# Patient Record
Sex: MALE | Race: OTHER | HISPANIC OR LATINO | Employment: FULL TIME | URBAN - METROPOLITAN AREA
[De-identification: names, ages, dates, MRNs, and addresses within clinical notes are randomized per-mention and may not be internally consistent; named-entity substitution may affect disease eponyms.]

---
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
## 2021-02-11 ENCOUNTER — OCCMED (OUTPATIENT)
Dept: URGENT CARE | Facility: CLINIC | Age: 21
End: 2021-02-11
Payer: OTHER MISCELLANEOUS

## 2021-02-11 DIAGNOSIS — Z02.6 ENCOUNTER RELATED TO WORKER'S COMPENSATION CLAIM: Primary | ICD-10-CM

## 2021-02-11 PROCEDURE — G0382 LEV 3 HOSP TYPE B ED VISIT: HCPCS | Performed by: NURSE PRACTITIONER

## 2021-02-11 PROCEDURE — 99283 EMERGENCY DEPT VISIT LOW MDM: CPT | Performed by: NURSE PRACTITIONER

## 2021-02-15 ENCOUNTER — OFFICE VISIT (OUTPATIENT)
Dept: URGENT CARE | Facility: CLINIC | Age: 21
End: 2021-02-15
Payer: OTHER MISCELLANEOUS

## 2021-02-15 DIAGNOSIS — Z02.6 ENCOUNTER RELATED TO WORKER'S COMPENSATION CLAIM: Primary | ICD-10-CM

## 2021-02-15 PROCEDURE — 99213 OFFICE O/P EST LOW 20 MIN: CPT | Performed by: NURSE PRACTITIONER

## 2021-07-14 ENCOUNTER — HOSPITAL ENCOUNTER (EMERGENCY)
Facility: HOSPITAL | Age: 21
Discharge: HOME | End: 2021-07-14
Attending: EMERGENCY MEDICINE | Admitting: EMERGENCY MEDICINE
Payer: COMMERCIAL

## 2021-07-14 VITALS
HEART RATE: 65 BPM | OXYGEN SATURATION: 98 % | RESPIRATION RATE: 16 BRPM | DIASTOLIC BLOOD PRESSURE: 58 MMHG | TEMPERATURE: 98.4 F | SYSTOLIC BLOOD PRESSURE: 120 MMHG

## 2021-07-14 DIAGNOSIS — R10.9 ABDOMINAL PAIN: Primary | ICD-10-CM

## 2021-07-14 LAB
ALBUMIN SERPL BCP-MCNC: 3.3 G/DL (ref 3.5–5)
ALP SERPL-CCNC: 50 U/L (ref 46–116)
ALT SERPL W P-5'-P-CCNC: 31 U/L (ref 12–78)
ANION GAP SERPL CALCULATED.3IONS-SCNC: 3 MMOL/L (ref 4–13)
AST SERPL W P-5'-P-CCNC: 20 U/L (ref 5–45)
BASOPHILS # BLD AUTO: 0.01 THOUSANDS/ΜL (ref 0–0.1)
BASOPHILS NFR BLD AUTO: 0 % (ref 0–1)
BILIRUB DIRECT SERPL-MCNC: 0.05 MG/DL (ref 0–0.2)
BILIRUB SERPL-MCNC: 0.52 MG/DL (ref 0.2–1)
BUN SERPL-MCNC: 13 MG/DL (ref 5–25)
CALCIUM SERPL-MCNC: 7.3 MG/DL (ref 8.3–10.1)
CHLORIDE SERPL-SCNC: 107 MMOL/L (ref 100–108)
CO2 SERPL-SCNC: 32 MMOL/L (ref 21–32)
CREAT SERPL-MCNC: 0.9 MG/DL (ref 0.6–1.3)
EOSINOPHIL # BLD AUTO: 0.08 THOUSAND/ΜL (ref 0–0.61)
EOSINOPHIL NFR BLD AUTO: 1 % (ref 0–6)
ERYTHROCYTE [DISTWIDTH] IN BLOOD BY AUTOMATED COUNT: 12.2 % (ref 11.6–15.1)
GFR SERPL CREATININE-BSD FRML MDRD: 122 ML/MIN/1.73SQ M
GLUCOSE SERPL-MCNC: 115 MG/DL (ref 65–140)
HCT VFR BLD AUTO: 44.9 % (ref 36.5–49.3)
HGB BLD-MCNC: 15.5 G/DL (ref 12–17)
IMM GRANULOCYTES # BLD AUTO: 0.01 THOUSAND/UL (ref 0–0.2)
IMM GRANULOCYTES NFR BLD AUTO: 0 % (ref 0–2)
LIPASE SERPL-CCNC: 111 U/L (ref 73–393)
LYMPHOCYTES # BLD AUTO: 2.58 THOUSANDS/ΜL (ref 0.6–4.47)
LYMPHOCYTES NFR BLD AUTO: 42 % (ref 14–44)
MCH RBC QN AUTO: 29.6 PG (ref 26.8–34.3)
MCHC RBC AUTO-ENTMCNC: 34.5 G/DL (ref 31.4–37.4)
MCV RBC AUTO: 86 FL (ref 82–98)
MONOCYTES # BLD AUTO: 0.56 THOUSAND/ΜL (ref 0.17–1.22)
MONOCYTES NFR BLD AUTO: 9 % (ref 4–12)
NEUTROPHILS # BLD AUTO: 2.92 THOUSANDS/ΜL (ref 1.85–7.62)
NEUTS SEG NFR BLD AUTO: 48 % (ref 43–75)
NRBC BLD AUTO-RTO: 0 /100 WBCS
PLATELET # BLD AUTO: 240 THOUSANDS/UL (ref 149–390)
PMV BLD AUTO: 10.1 FL (ref 8.9–12.7)
POTASSIUM SERPL-SCNC: 3.6 MMOL/L (ref 3.5–5.3)
PROT SERPL-MCNC: 6.9 G/DL (ref 6.4–8.2)
RBC # BLD AUTO: 5.23 MILLION/UL (ref 3.88–5.62)
SODIUM SERPL-SCNC: 142 MMOL/L (ref 136–145)
WBC # BLD AUTO: 6.16 THOUSAND/UL (ref 4.31–10.16)

## 2021-07-14 PROCEDURE — 80048 BASIC METABOLIC PNL TOTAL CA: CPT | Performed by: EMERGENCY MEDICINE

## 2021-07-14 PROCEDURE — 85025 COMPLETE CBC W/AUTO DIFF WBC: CPT | Performed by: EMERGENCY MEDICINE

## 2021-07-14 PROCEDURE — 83690 ASSAY OF LIPASE: CPT | Performed by: EMERGENCY MEDICINE

## 2021-07-14 PROCEDURE — 96374 THER/PROPH/DIAG INJ IV PUSH: CPT

## 2021-07-14 PROCEDURE — 99284 EMERGENCY DEPT VISIT MOD MDM: CPT

## 2021-07-14 PROCEDURE — 99284 EMERGENCY DEPT VISIT MOD MDM: CPT | Performed by: EMERGENCY MEDICINE

## 2021-07-14 PROCEDURE — 36415 COLL VENOUS BLD VENIPUNCTURE: CPT | Performed by: EMERGENCY MEDICINE

## 2021-07-14 PROCEDURE — 80076 HEPATIC FUNCTION PANEL: CPT | Performed by: EMERGENCY MEDICINE

## 2021-07-14 RX ORDER — SUCRALFATE 1 G/1
1 TABLET ORAL ONCE
Status: COMPLETED | OUTPATIENT
Start: 2021-07-14 | End: 2021-07-14

## 2021-07-14 RX ORDER — ONDANSETRON 2 MG/ML
4 INJECTION INTRAMUSCULAR; INTRAVENOUS ONCE
Status: COMPLETED | OUTPATIENT
Start: 2021-07-14 | End: 2021-07-14

## 2021-07-14 RX ORDER — SUCRALFATE 1 G/1
1 TABLET ORAL 4 TIMES DAILY
Qty: 60 TABLET | Refills: 0 | Status: SHIPPED | OUTPATIENT
Start: 2021-07-14

## 2021-07-14 RX ADMIN — SODIUM CHLORIDE 1000 ML: 0.9 INJECTION, SOLUTION INTRAVENOUS at 00:48

## 2021-07-14 RX ADMIN — SUCRALFATE 1 G: 1 TABLET ORAL at 00:48

## 2021-07-14 RX ADMIN — ONDANSETRON 4 MG: 2 INJECTION INTRAMUSCULAR; INTRAVENOUS at 00:49

## 2021-07-14 NOTE — Clinical Note
Sven Reyescrystal was seen and treated in our emergency department on 7/14/2021  Diagnosis:     Adeola Brenner    He may return on this date: 07/15/2021    Or sooner     If you have any questions or concerns, please don't hesitate to call        Paramjit Bonner MD    ______________________________           _______________          _______________  Hospital Representative                              Date                                Time

## 2021-07-14 NOTE — DISCHARGE INSTRUCTIONS
Patient Instructions: Today you were seen in the emergency department for abdominal pain  We reviewed your labs and determined that you would be able to be discharged  You should take carafate and tylenol as needed for your pain  You should follow up with GI  Please return to the emergency department if your symptoms get worse, you develop a fever, or you have any other symptoms we discussed today prior to discharge  Nice to meet you! Best of luck with everything!

## 2021-07-14 NOTE — ED ATTENDING ATTESTATION
2021  Kwasi KYLE Ra, DO, saw and evaluated the patient  I have discussed the patient with the resident/non-physician practitioner and agree with the resident's/non-physician practitioner's findings, Plan of Care, and MDM as documented in the resident's/non-physician practitioner's note, except where noted  All available labs and Radiology studies were reviewed  I was present for key portions of any procedure(s) performed by the resident/non-physician practitioner and I was immediately available to provide assistance  At this point I agree with the current assessment done in the Emergency Department  I have conducted an independent evaluation of this patient a history and physical is as follows:    Dany KYLE DO, saw and evaluated the patient  All available labs and X-rays were reviewed  I discussed the patient with the resident / non-physician and agree with the resident's / non-physician practitioner's findings and plan as documented in the resident's / non-physician practicitioner's note, except where noted  At this point, I agree with the current assessment done in the ED      NAME: Govind Camilo  AGE: 23 y o  SEX: male  : 2000   MRN: [id number]  ENCOUNTER: [id number]    DATE: 2021  TIME: 2:16 AM      History of Present Illness   Govind Camilo is a 23 y o  male who presents with Abdominal Pain (Pt reports mid quad abd pain, nausea and diarrhea on/off for two weeks  )    has no past medical history on file        Past Medical History   History reviewed  No pertinent past medical history  Past Surgical History   History reviewed  No pertinent surgical history  Social History     Social History     Substance and Sexual Activity   Alcohol Use No     Social History     Substance and Sexual Activity   Drug Use No     Social History     Tobacco Use   Smoking Status Never Smoker       Family History   History reviewed  No pertinent family history      Medications Prior to Admission     Prior to Admission medications    Medication Sig Start Date End Date Taking? Authorizing Provider   sucralfate (CARAFATE) 1 g tablet Take 1 tablet (1 g total) by mouth 4 (four) times a day 7/14/21   Trisha Hyatt MD       Allergies   No Known Allergies    Objective     Vitals:    07/14/21 0012 07/14/21 0214   BP: 144/89 120/58   BP Location:  Left arm   Pulse: 76 65   Resp: 18 16   Temp: 98 4 °F (36 9 °C)    TempSrc: Oral    SpO2: 97% 98%     There is no height or weight on file to calculate BMI  Intake/Output Summary (Last 24 hours) at 7/14/2021 0216  Last data filed at 7/14/2021 0117  Gross per 24 hour   Intake 1000 ml   Output --   Net 1000 ml     Invasive Devices     None                 Physical Exam - Examined after meds  General: awake, alert, no acute distress  Head: normocephalic, atraumatic  Eyes: no scleral icterus  Ears: external ears normal, hearing grossly intact  Nose: external exam grossly normal  Neck: symmetric, No JVD noted, trachea midline  Pulmonary: no respiratory distress, no tachypnea noted  Cardiovascular: appears well perfused  Abdomen: no distention noted, soft, non tender, no rigidity, rebound or guarding  Musculoskeletal: no deformities noted, tone normal  Neuro: grossly non-focal  Psych: mood and affect appropriate    Lab Results:    Labs Reviewed   BASIC METABOLIC PANEL - Abnormal       Result Value Ref Range Status    Sodium 142  136 - 145 mmol/L Final    Potassium 3 6  3 5 - 5 3 mmol/L Final    Comment: Slightly Hemolyzed;  Results May be Affected    Chloride 107  100 - 108 mmol/L Final    CO2 32  21 - 32 mmol/L Final    ANION GAP 3 (*) 4 - 13 mmol/L Final    BUN 13  5 - 25 mg/dL Final    Creatinine 0 90  0 60 - 1 30 mg/dL Final    Comment: Standardized to IDMS reference method    Glucose 115  65 - 140 mg/dL Final    Comment: Specimen Lipemic; Results May be Affected  If the patient is fasting, the ADA then defines impaired fasting glucose as > 100 mg/dL and diabetes as > or equal to 123 mg/dL  Specimen collection should occur prior to Sulfasalazine administration due to the potential for falsely depressed results  Specimen collection should occur prior to Sulfapyridine administration due to the potential for falsely elevated results  Calcium 7 3 (*) 8 3 - 10 1 mg/dL Final    eGFR 122  ml/min/1 73sq m Final    Narrative:     [place] guidelines for Chronic Kidney Disease (CKD):     Stage 1 with normal or high GFR (GFR > 90 mL/min/1 73 square meters)    Stage 2 Mild CKD (GFR = 60-89 mL/min/1 73 square meters)    Stage 3A Moderate CKD (GFR = 45-59 mL/min/1 73 square meters)    Stage 3B Moderate CKD (GFR = 30-44 mL/min/1 73 square meters)    Stage 4 Severe CKD (GFR = 15-29 mL/min/1 73 square meters)    Stage 5 End Stage CKD (GFR <15 mL/min/1 73 square meters)  Note: GFR calculation is accurate only with a steady state creatinine   HEPATIC FUNCTION PANEL - Abnormal    Total Bilirubin 0 52  0 20 - 1 00 mg/dL Final    Comment: Use of this assay is not recommended for patients undergoing treatment with eltrombopag due to the potential for falsely elevated results  Bilirubin, Direct 0 05  0 00 - 0 20 mg/dL Final    Comment: 13Specimen Lipemic; Results May be Affected    Alkaline Phosphatase 50  46 - 116 U/L Final    AST 20  5 - 45 U/L Final    Comment: 6  Specimen collection should occur prior to Sulfasalazine administration due to the potential for falsely depressed results  ALT 31  12 - 78 U/L Final    Comment: 6  Specimen collection should occur prior to Sulfasalazine administration due to the potential for falsely depressed results       Total Protein 6 9  6 4 - 8 2 g/dL Final    Comment: 6    Albumin 3 3 (*) 3 5 - 5 0 g/dL Final   LIPASE - Normal    Lipase 111  73 - 393 u/L Final   CBC AND DIFFERENTIAL    WBC 6 16  4 31 - 10 16 Thousand/uL Final    RBC 5 23  3 88 - 5 62 Million/uL Final    Hemoglobin 15 5  12 0 - 17 0 g/dL Final Hematocrit 44 9  36 5 - 49 3 % Final    MCV 86  82 - 98 fL Final    MCH 29 6  26 8 - 34 3 pg Final    MCHC 34 5  31 4 - 37 4 g/dL Final    RDW 12 2  11 6 - 15 1 % Final    MPV 10 1  8 9 - 12 7 fL Final    Platelets 007  278 - 390 Thousands/uL Final    nRBC 0  /100 WBCs Final    Neutrophils Relative 48  43 - 75 % Final    Immat GRANS % 0  0 - 2 % Final    Lymphocytes Relative 42  14 - 44 % Final    Monocytes Relative 9  4 - 12 % Final    Eosinophils Relative 1  0 - 6 % Final    Basophils Relative 0  0 - 1 % Final    Neutrophils Absolute 2 92  1 85 - 7 62 Thousands/µL Final    Immature Grans Absolute 0 01  0 00 - 0 20 Thousand/uL Final    Lymphocytes Absolute 2 58  0 60 - 4 47 Thousands/µL Final    Monocytes Absolute 0 56  0 17 - 1 22 Thousand/µL Final    Eosinophils Absolute 0 08  0 00 - 0 61 Thousand/µL Final    Basophils Absolute 0 01  0 00 - 0 10 Thousands/µL Final         Imaging:   No orders to display         Medications given in Emergency Department     Medication Administration - last 24 hours from 07/13/2021 0216 to 07/14/2021 0216       Date/Time Order Dose Route Action Action by     07/14/2021 0117 sodium chloride 0 9 % bolus 1,000 mL 0 mL Intravenous Stopped Elke Olson RN     07/14/2021 0048 sodium chloride 0 9 % bolus 1,000 mL 1,000 mL Intravenous [address] Elke Olson RN     07/14/2021 0048 sucralfate (CARAFATE) tablet 1 g 1 g Oral Given Elke Olson RN     07/14/2021 0049 ondansetron (ZOFRAN) injection 4 mg 4 mg Intravenous Given Elke Olson RN          Assessment and Plan  Abdominal pain with history of gastritis    Plan is to check labs, hydrate with IV fluids and give Zofran and Carafate    Patient much improved after medications  Labs are normal   Plan is for discharge home with GI follow-up and prescription for Carafate with bland diet   Active Problems:    * No active hospital problems  *      Final Diagnosis:  1   Abdominal pain        ED Course         Critical Care Time  Procedures

## 2021-07-15 NOTE — ED PROVIDER NOTES
Final Diagnosis:  1  Abdominal pain         Chief Complaint   Patient presents with    Abdominal Pain     Pt reports mid quad abd pain, nausea and diarrhea on/off for two weeks  ASSESSMENT + PLAN:   - Nursing note reviewed  1  Abdominal pain  -Reassuring abdominal examination, no acute distress  -No labs historically, will get some labs  -Sucralfate   -IV fluids/IV antiemetics  -Reassess       Final Dispo   Patient was reassessed  Vital signs stable  Patient and/or family given discharge instructions and return precautions  Patient and/or family was reassured  The patient and/or family vocalizes understanding  Answered all of the patient's and/or family's questions  Will follow up with GI  Patient and/or family are agreeable to the plan  Medications   sodium chloride 0 9 % bolus 1,000 mL (0 mL Intravenous Stopped 7/14/21 0117)   sucralfate (CARAFATE) tablet 1 g (1 g Oral Given 7/14/21 0048)   ondansetron (ZOFRAN) injection 4 mg (4 mg Intravenous Given 7/14/21 0049)     Time reflects when diagnosis was documented in both MDM as applicable and the Disposition within this note     Time User Action Codes Description Comment    7/14/2021  2:04 AM Jaren Gomez [R10 [id number]] Abdominal pain       ED Disposition     ED Disposition Condition Date/Time Comment    Discharge Stable Wed Jul 14, 2021  2:04 AM Ehsan Sellers discharge to home/self care              Follow-up Information     Follow up With Specialties Details Why Contact Info Additional [facility] Call   [address]  [address] [facility] [address]  [address] [facility], [address], [address] [place], [place], [address] [phone]        Discharge Medication List as of 7/14/2021  2:05 AM      START taking these medications    Details   sucralfate (CARAFATE) 1 g tablet Take 1 tablet (1 g total) by mouth 4 (four) times a day, Starting Wed 7/14/2021, Normal             None       History of Present Illness: This is a 23 y o  male presenting today for evaluation of abdominal pain  Patient says he's had on and off epigastric pain for years  Has never seen a GI doctor for this  Endorses nausea and diarrhea, but no vomiting  No hematemesis or dark stools  No bright red blood per rectum  No fever, chills, chest pain, shortness of breath, headache, vomiting, rash or any other complaints  Patient notes that they have been eating/drinking normally       - No language barrier    - History obtained from patient and chart   - There are no limitations to the history obtained  - Previous charting was reviewed  Some data reviewed included below for ease of access whether or not it is relevant to this patient encounter  Past Medical, Past Surgical History:    has no past medical history on file  has no past surgical history on file  Allergies:   No Known Allergies    Social and Family History:     Social History     Substance and Sexual Activity   Alcohol Use No     Social History     Tobacco Use   Smoking Status Never Smoker     Social History     Substance and Sexual Activity   Drug Use No       Review of Systems:   Review of Systems   Constitutional: Negative for chills, diaphoresis and fever  HENT: Negative  Eyes: Negative  Negative for visual disturbance  Respiratory: Negative  Negative for shortness of breath  Cardiovascular: Negative  Negative for chest pain  Gastrointestinal: Positive for abdominal pain, diarrhea and nausea  Negative for vomiting  Endocrine: Negative  Genitourinary: Negative  Musculoskeletal: Negative  Negative for myalgias  Skin: Negative  Negative for rash  Allergic/Immunologic: Negative  Neurological: Negative  Negative for weakness, light-headedness, numbness and headaches  Hematological: Negative  Psychiatric/Behavioral: Negative      All other systems reviewed and are negative  Physical Examination     Vitals:    07/14/21 0012 07/14/21 0214   BP: 144/89 120/58   BP Location:  Left arm   Pulse: 76 65   Resp: 18 16   Temp: 98 4 °F (36 9 °C)    TempSrc: Oral    SpO2: 97% 98%     Vitals reviewed by me  Physical Exam  Vitals and nursing note reviewed  Constitutional:       General: He is not in acute distress  Appearance: He is well-developed  HENT:      Head: Normocephalic and atraumatic  Eyes:      General: No scleral icterus  Cardiovascular:      Rate and Rhythm: Normal rate and regular rhythm  Pulmonary:      Effort: Pulmonary effort is normal       Breath sounds: Normal breath sounds  Abdominal:      General: There is no distension  Palpations: Abdomen is soft  Tenderness: There is no abdominal tenderness  There is no guarding or rebound  Musculoskeletal:         General: Normal range of motion  Cervical back: Normal range of motion and neck supple  Skin:     General: Skin is warm and dry  Neurological:      Mental Status: He is alert and oriented to person, place, and time  Comments: Grossly neuro intact; Able to move all extremities   Psychiatric:         Mood and Affect: Mood normal                               No orders to display     Orders Placed This Encounter   Procedures    Basic metabolic panel    Hepatic function panel    Lipase    CBC and differential    Ambulatory referral to Gastroenterology       Labs:     Labs Reviewed   BASIC METABOLIC PANEL - Abnormal       Result Value Ref Range Status    Sodium 142  136 - 145 mmol/L Final    Potassium 3 6  3 5 - 5 3 mmol/L Final    Comment: Slightly Hemolyzed;  Results May be Affected    Chloride 107  100 - 108 mmol/L Final    CO2 32  21 - 32 mmol/L Final    ANION GAP 3 (*) 4 - 13 mmol/L Final    BUN 13  5 - 25 mg/dL Final    Creatinine 0 90  0 60 - 1 30 mg/dL Final    Comment: Standardized to IDMS reference method    Glucose 115  65 - 140 mg/dL Final Comment: Specimen Lipemic; Results May be Affected  If the patient is fasting, the ADA then defines impaired fasting glucose as > 100 mg/dL and diabetes as > or equal to 123 mg/dL  Specimen collection should occur prior to Sulfasalazine administration due to the potential for falsely depressed results  Specimen collection should occur prior to Sulfapyridine administration due to the potential for falsely elevated results  Calcium 7 3 (*) 8 3 - 10 1 mg/dL Final    eGFR 122  ml/min/1 73sq m Final    Narrative:     [place] guidelines for Chronic Kidney Disease (CKD):     Stage 1 with normal or high GFR (GFR > 90 mL/min/1 73 square meters)    Stage 2 Mild CKD (GFR = 60-89 mL/min/1 73 square meters)    Stage 3A Moderate CKD (GFR = 45-59 mL/min/1 73 square meters)    Stage 3B Moderate CKD (GFR = 30-44 mL/min/1 73 square meters)    Stage 4 Severe CKD (GFR = 15-29 mL/min/1 73 square meters)    Stage 5 End Stage CKD (GFR <15 mL/min/1 73 square meters)  Note: GFR calculation is accurate only with a steady state creatinine   HEPATIC FUNCTION PANEL - Abnormal    Total Bilirubin 0 52  0 20 - 1 00 mg/dL Final    Comment: Use of this assay is not recommended for patients undergoing treatment with eltrombopag due to the potential for falsely elevated results  Bilirubin, Direct 0 05  0 00 - 0 20 mg/dL Final    Comment: 13Specimen Lipemic; Results May be Affected    Alkaline Phosphatase 50  46 - 116 U/L Final    AST 20  5 - 45 U/L Final    Comment: 6  Specimen collection should occur prior to Sulfasalazine administration due to the potential for falsely depressed results  ALT 31  12 - 78 U/L Final    Comment: 6  Specimen collection should occur prior to Sulfasalazine administration due to the potential for falsely depressed results       Total Protein 6 9  6 4 - 8 2 g/dL Final    Comment: 6    Albumin 3 3 (*) 3 5 - 5 0 g/dL Final   LIPASE - Normal    Lipase 111  73 - 393 u/L Final   CBC AND DIFFERENTIAL    WBC 6 16  4 31 - 10 16 Thousand/uL Final    RBC 5 23  3 88 - 5 62 Million/uL Final    Hemoglobin 15 5  12 0 - 17 0 g/dL Final    Hematocrit 44 9  36 5 - 49 3 % Final    MCV 86  82 - 98 fL Final    MCH 29 6  26 8 - 34 3 pg Final    MCHC 34 5  31 4 - 37 4 g/dL Final    RDW 12 2  11 6 - 15 1 % Final    MPV 10 1  8 9 - 12 7 fL Final    Platelets 833  646 - 390 Thousands/uL Final    nRBC 0  /100 WBCs Final    Neutrophils Relative 48  43 - 75 % Final    Immat GRANS % 0  0 - 2 % Final    Lymphocytes Relative 42  14 - 44 % Final    Monocytes Relative 9  4 - 12 % Final    Eosinophils Relative 1  0 - 6 % Final    Basophils Relative 0  0 - 1 % Final    Neutrophils Absolute 2 92  1 85 - 7 62 Thousands/µL Final    Immature Grans Absolute 0 01  0 00 - 0 20 Thousand/uL Final    Lymphocytes Absolute 2 58  0 60 - 4 47 Thousands/µL Final    Monocytes Absolute 0 56  0 17 - 1 22 Thousand/µL Final    Eosinophils Absolute 0 08  0 00 - 0 61 Thousand/µL Final    Basophils Absolute 0 01  0 00 - 0 10 Thousands/µL Final       Imaging:   No results found  Final Diagnosis:  1  Abdominal pain        Code Status: No Order    Portions of the record may have been created with voice recognition software  Occasional wrong word or "sound a like" substitutions may have occurred due to the inherent limitations of voice recognition software  Read the chart carefully and recognize, using context, where substitutions have occurred      Electronically signed by:  Joseph Cardenas, PGY 3, MD Oscar Yan MD  07/14/21 3093

## 2021-07-16 ENCOUNTER — APPOINTMENT (OUTPATIENT)
Dept: LAB | Facility: AMBULARY SURGERY CENTER | Age: 21
End: 2021-07-16
Attending: INTERNAL MEDICINE
Payer: COMMERCIAL

## 2021-07-16 ENCOUNTER — OFFICE VISIT (OUTPATIENT)
Dept: GASTROENTEROLOGY | Facility: AMBULARY SURGERY CENTER | Age: 21
End: 2021-07-16
Payer: COMMERCIAL

## 2021-07-16 VITALS
BODY MASS INDEX: 30.71 KG/M2 | WEIGHT: 247 LBS | SYSTOLIC BLOOD PRESSURE: 118 MMHG | HEIGHT: 75 IN | DIASTOLIC BLOOD PRESSURE: 62 MMHG

## 2021-07-16 DIAGNOSIS — R19.7 DIARRHEA, UNSPECIFIED TYPE: ICD-10-CM

## 2021-07-16 DIAGNOSIS — R10.13 EPIGASTRIC PAIN: Primary | ICD-10-CM

## 2021-07-16 DIAGNOSIS — R10.9 ABDOMINAL PAIN: ICD-10-CM

## 2021-07-16 LAB
CRP SERPL QL: <3 MG/L
ERYTHROCYTE [SEDIMENTATION RATE] IN BLOOD: 7 MM/HOUR (ref 0–14)

## 2021-07-16 PROCEDURE — 82785 ASSAY OF IGE: CPT

## 2021-07-16 PROCEDURE — 86008 ALLG SPEC IGE RECOMB EA: CPT

## 2021-07-16 PROCEDURE — 99244 OFF/OP CNSLTJ NEW/EST MOD 40: CPT | Performed by: INTERNAL MEDICINE

## 2021-07-16 PROCEDURE — 86003 ALLG SPEC IGE CRUDE XTRC EA: CPT

## 2021-07-16 PROCEDURE — 36415 COLL VENOUS BLD VENIPUNCTURE: CPT

## 2021-07-16 PROCEDURE — 83516 IMMUNOASSAY NONANTIBODY: CPT

## 2021-07-16 PROCEDURE — 82784 ASSAY IGA/IGD/IGG/IGM EACH: CPT

## 2021-07-16 PROCEDURE — 86140 C-REACTIVE PROTEIN: CPT

## 2021-07-16 PROCEDURE — 86255 FLUORESCENT ANTIBODY SCREEN: CPT

## 2021-07-16 PROCEDURE — 85652 RBC SED RATE AUTOMATED: CPT

## 2021-07-16 RX ORDER — PANTOPRAZOLE SODIUM 40 MG/1
40 TABLET, DELAYED RELEASE ORAL DAILY
Qty: 30 TABLET | Refills: 2 | Status: SHIPPED | OUTPATIENT
Start: 2021-07-16

## 2021-07-16 RX ORDER — ONDANSETRON 4 MG/1
TABLET, ORALLY DISINTEGRATING ORAL
COMMUNITY
Start: 2021-05-03

## 2021-07-16 RX ORDER — OMEPRAZOLE 20 MG/1
20 CAPSULE, DELAYED RELEASE ORAL DAILY
COMMUNITY
Start: 2021-05-03 | End: 2021-07-16 | Stop reason: ALTCHOICE

## 2021-07-16 NOTE — H&P (VIEW-ONLY)
Consultation - [facility]  Jero Azevedo 2000 male         Chief Complaint:   Abdominal pain, diarrhea    HPI:   30-year-old male with no significant past medical history reports having abdominal pain for a couple of months  Describes as cramping and stabbing pain in the epigastric area for which he was seen in the ER couple of days ago  All the lab workup was normal and was discharged home on Carafate  Denies any NSAID use  Denies any heartburn acid reflux  He also reports having diarrhea during those painful episodes  Denies any recent travel history or antibiotic use  Good appetite, no recent weight loss  REVIEW OF SYSTEMS: Review of Systems   Constitutional: Negative for activity change, appetite change, chills, diaphoresis, fatigue, fever and unexpected weight change  HENT: Negative for ear discharge, ear pain, facial swelling, hearing loss, nosebleeds, sore throat, tinnitus and voice change  Eyes: Negative for pain, discharge, redness, itching and visual disturbance  Respiratory: Negative for apnea, cough, chest tightness, shortness of breath and wheezing  Cardiovascular: Negative for chest pain and palpitations  Gastrointestinal:        As noted in HPI   Endocrine: Negative for cold intolerance, heat intolerance and polyuria  Genitourinary: Negative for difficulty urinating, dysuria, flank pain, hematuria and urgency  Musculoskeletal: Negative for arthralgias, back pain, gait problem, joint swelling and myalgias  Skin: Negative for rash and wound  Neurological: Negative for dizziness, tremors, seizures, speech difficulty, light-headedness, numbness and headaches  Hematological: Negative for adenopathy  Does not bruise/bleed easily  Psychiatric/Behavioral: Negative for agitation, behavioral problems and confusion  The patient is not nervous/anxious  History reviewed  No pertinent past medical history     Past Surgical History:   Procedure Laterality Date    WISDOM TOOTH EXTRACTION       Social History     Socioeconomic History    Marital status: Single     Spouse name: Not on file    Number of children: Not on file    Years of education: Not on file    Highest education level: Not on file   Occupational History    Not on file   Tobacco Use    Smoking status: Never Smoker    Smokeless tobacco: Never Used   Substance and Sexual Activity    Alcohol use: No    Drug use: No    Sexual activity: Not on file   Other Topics Concern    Not on file   Social History Narrative    Not on file     Social Determinants of Health     Financial Resource Strain:     Difficulty of Paying Living Expenses:    Food Insecurity:     Worried About Running Out of Food in the Last Year:     [address] in the Last Year:    Transportation Needs:     Lack of Transportation (Medical):  Lack of Transportation (Non-Medical):    Physical Activity:     Days of Exercise per Week:     Minutes of Exercise per Session:    Stress:     Feeling of Stress :    Social Connections:     Frequency of Communication with Friends and Family:     Frequency of Social Gatherings with Friends and Family:     Attends [employer] Services:     Active Member of Clubs or Organizations:     Attends Club or Organization Meetings:     Marital Status:    Intimate Partner Violence:     Fear of Current or Ex-Partner:     Emotionally Abused:     Physically Abused:     Sexually Abused:      History reviewed  No pertinent family history  Patient has no known allergies  Current Outpatient Medications   Medication Sig Dispense Refill    ondansetron (ZOFRAN-ODT) 4 mg disintegrating tablet       sucralfate (CARAFATE) 1 g tablet Take 1 tablet (1 g total) by mouth 4 (four) times a day 60 tablet 0    pantoprazole (PROTONIX) 40 mg tablet Take 1 tablet (40 mg total) by mouth daily 30 tablet 2     No current facility-administered medications for this visit         Blood pressure 118/62, height 6' 3" (1 905 m), weight 112 kg (247 lb)  PHYSICAL EXAM: Physical Exam  Constitutional:       Appearance: He is well-developed  HENT:      Head: Normocephalic and atraumatic  Eyes:      General: No scleral icterus  Right eye: No discharge  Left eye: No discharge  Conjunctiva/sclera: Conjunctivae normal       Pupils: Pupils are equal, round, and reactive to light  Neck:      Thyroid: No thyromegaly  Vascular: No JVD  Trachea: No tracheal deviation  Cardiovascular:      Rate and Rhythm: Normal rate and regular rhythm  Heart sounds: Normal heart sounds  No murmur heard  No friction rub  No gallop  Pulmonary:      Effort: Pulmonary effort is normal  No respiratory distress  Breath sounds: Normal breath sounds  No wheezing or rales  Chest:      Chest wall: No tenderness  Abdominal:      General: Bowel sounds are normal  There is no distension  Palpations: Abdomen is soft  There is no mass  Tenderness: There is no abdominal tenderness  There is no guarding or rebound  Hernia: No hernia is present  Musculoskeletal:      Cervical back: Neck supple  Lymphadenopathy:      Cervical: No cervical adenopathy  Skin:     General: Skin is warm and dry  Findings: No erythema or rash  Neurological:      Mental Status: He is alert and oriented to person, place, and time  Psychiatric:         Behavior: Behavior normal          Thought Content:  Thought content normal           Lab Results   Component Value Date    WBC 6 16 07/14/2021    HGB 15 5 07/14/2021    HCT 44 9 07/14/2021    MCV 86 07/14/2021     07/14/2021     Lab Results   Component Value Date    CALCIUM 7 3 (L) 07/14/2021    K 3 6 07/14/2021    CO2 32 07/14/2021     07/14/2021    BUN 13 07/14/2021    CREATININE 0 90 07/14/2021     Lab Results   Component Value Date    ALT 31 07/14/2021    AST 20 07/14/2021    ALKPHOS 50 07/14/2021     No results found for: INR, PROTIME    No results found  ASSESSMENT & PLAN:    Epigastric pain    Abdominal pain associated with diarrhea -concerning for IBD or possible IBS  Rule out celiac disease or food intolerance    - schedule for upper endoscopy    - continue Carafate and put him on Protonix regularly    - Patient was explained about the lifestyle and dietary modifications  Advised to avoid fatty foods, chocolates, caffeine, alcohol and any other triggering foods  Avoid eating for at least 3 hours before going to bed  Diarrhea   Possible functional symptoms from IBS but should rule out Crohn's disease     - explained to patient in detail about different possible etiologies and given reassurance  -  Check CRP, sed rate    - celiac disease panel and food allergy profile    - fecal calprotectin and leukocytes     -Schedule for colonoscopy  -High-fiber diet     -Patient was given instructions about the colonoscopy prep     -Patient was explained about  the risks and benefits of the procedure  Risks including but not limited to bleeding, infection, perforation were explained in detail  Also explained about less than 100% sensitivity with the exam and other alternatives

## 2021-07-16 NOTE — PROGRESS NOTES
Consultation - [facility]  [facility] 2000 male         Chief Complaint:   Abdominal pain, diarrhea    HPI:   21-year-old male with no significant past medical history reports having abdominal pain for a couple of months  Describes as cramping and stabbing pain in the epigastric area for which he was seen in the ER couple of days ago  All the lab workup was normal and was discharged home on Carafate  Denies any NSAID use  Denies any heartburn acid reflux  He also reports having diarrhea during those painful episodes  Denies any recent travel history or antibiotic use  Good appetite, no recent weight loss  REVIEW OF SYSTEMS: Review of Systems   Constitutional: Negative for activity change, appetite change, chills, diaphoresis, fatigue, fever and unexpected weight change  HENT: Negative for ear discharge, ear pain, facial swelling, hearing loss, nosebleeds, sore throat, tinnitus and voice change  Eyes: Negative for pain, discharge, redness, itching and visual disturbance  Respiratory: Negative for apnea, cough, chest tightness, shortness of breath and wheezing  Cardiovascular: Negative for chest pain and palpitations  Gastrointestinal:        As noted in HPI   Endocrine: Negative for cold intolerance, heat intolerance and polyuria  Genitourinary: Negative for difficulty urinating, dysuria, flank pain, hematuria and urgency  Musculoskeletal: Negative for arthralgias, back pain, gait problem, joint swelling and myalgias  Skin: Negative for rash and wound  Neurological: Negative for dizziness, tremors, seizures, speech difficulty, light-headedness, numbness and headaches  Hematological: Negative for adenopathy  Does not bruise/bleed easily  Psychiatric/Behavioral: Negative for agitation, behavioral problems and confusion  The patient is not nervous/anxious  History reviewed  No pertinent past medical history     Past Surgical History:   Procedure Laterality Date    WISDOM TOOTH EXTRACTION       Social History     Socioeconomic History    Marital status: Single     Spouse name: Not on file    Number of children: Not on file    Years of education: Not on file    Highest education level: Not on file   Occupational History    Not on file   Tobacco Use    Smoking status: Never Smoker    Smokeless tobacco: Never Used   Substance and Sexual Activity    Alcohol use: No    Drug use: No    Sexual activity: Not on file   Other Topics Concern    Not on file   Social History Narrative    Not on file     Social Determinants of Health     Financial Resource Strain:     Difficulty of Paying Living Expenses:    Food Insecurity:     Worried About Running Out of Food in the Last Year:     [address] in the Last Year:    Transportation Needs:     Lack of Transportation (Medical):  Lack of Transportation (Non-Medical):    Physical Activity:     Days of Exercise per Week:     Minutes of Exercise per Session:    Stress:     Feeling of Stress :    Social Connections:     Frequency of Communication with Friends and Family:     Frequency of Social Gatherings with Friends and Family:     Attends [employer] Services:     Active Member of Clubs or Organizations:     Attends Club or Organization Meetings:     Marital Status:    Intimate Partner Violence:     Fear of Current or Ex-Partner:     Emotionally Abused:     Physically Abused:     Sexually Abused:      History reviewed  No pertinent family history  Patient has no known allergies  Current Outpatient Medications   Medication Sig Dispense Refill    ondansetron (ZOFRAN-ODT) 4 mg disintegrating tablet       sucralfate (CARAFATE) 1 g tablet Take 1 tablet (1 g total) by mouth 4 (four) times a day 60 tablet 0    pantoprazole (PROTONIX) 40 mg tablet Take 1 tablet (40 mg total) by mouth daily 30 tablet 2     No current facility-administered medications for this visit         Blood pressure 118/62, height 6' 3" (1 905 m), weight 112 kg (247 lb)  PHYSICAL EXAM: Physical Exam  Constitutional:       Appearance: He is well-developed  HENT:      Head: Normocephalic and atraumatic  Eyes:      General: No scleral icterus  Right eye: No discharge  Left eye: No discharge  Conjunctiva/sclera: Conjunctivae normal       Pupils: Pupils are equal, round, and reactive to light  Neck:      Thyroid: No thyromegaly  Vascular: No JVD  Trachea: No tracheal deviation  Cardiovascular:      Rate and Rhythm: Normal rate and regular rhythm  Heart sounds: Normal heart sounds  No murmur heard  No friction rub  No gallop  Pulmonary:      Effort: Pulmonary effort is normal  No respiratory distress  Breath sounds: Normal breath sounds  No wheezing or rales  Chest:      Chest wall: No tenderness  Abdominal:      General: Bowel sounds are normal  There is no distension  Palpations: Abdomen is soft  There is no mass  Tenderness: There is no abdominal tenderness  There is no guarding or rebound  Hernia: No hernia is present  Musculoskeletal:      Cervical back: Neck supple  Lymphadenopathy:      Cervical: No cervical adenopathy  Skin:     General: Skin is warm and dry  Findings: No erythema or rash  Neurological:      Mental Status: He is alert and oriented to person, place, and time  Psychiatric:         Behavior: Behavior normal          Thought Content:  Thought content normal           Lab Results   Component Value Date    WBC 6 16 07/14/2021    HGB 15 5 07/14/2021    HCT 44 9 07/14/2021    MCV 86 07/14/2021     07/14/2021     Lab Results   Component Value Date    CALCIUM 7 3 (L) 07/14/2021    K 3 6 07/14/2021    CO2 32 07/14/2021     07/14/2021    BUN 13 07/14/2021    CREATININE 0 90 07/14/2021     Lab Results   Component Value Date    ALT 31 07/14/2021    AST 20 07/14/2021    ALKPHOS 50 07/14/2021     No results found for: INR, PROTIME    No results found  ASSESSMENT & PLAN:    Epigastric pain    Abdominal pain associated with diarrhea -concerning for IBD or possible IBS  Rule out celiac disease or food intolerance    - schedule for upper endoscopy    - continue Carafate and put him on Protonix regularly    - Patient was explained about the lifestyle and dietary modifications  Advised to avoid fatty foods, chocolates, caffeine, alcohol and any other triggering foods  Avoid eating for at least 3 hours before going to bed  Diarrhea   Possible functional symptoms from IBS but should rule out Crohn's disease     - explained to patient in detail about different possible etiologies and given reassurance  -  Check CRP, sed rate    - celiac disease panel and food allergy profile    - fecal calprotectin and leukocytes     -Schedule for colonoscopy  -High-fiber diet     -Patient was given instructions about the colonoscopy prep     -Patient was explained about  the risks and benefits of the procedure  Risks including but not limited to bleeding, infection, perforation were explained in detail  Also explained about less than 100% sensitivity with the exam and other alternatives

## 2021-07-16 NOTE — ASSESSMENT & PLAN NOTE
Possible functional symptoms from IBS but should rule out Crohn's disease     - explained to patient in detail about different possible etiologies and given reassurance  -  Check CRP, sed rate    - celiac disease panel and food allergy profile    - fecal calprotectin and leukocytes     -Schedule for colonoscopy  -High-fiber diet     -Patient was given instructions about the colonoscopy prep     -Patient was explained about  the risks and benefits of the procedure  Risks including but not limited to bleeding, infection, perforation were explained in detail  Also explained about less than 100% sensitivity with the exam and other alternatives

## 2021-07-16 NOTE — ASSESSMENT & PLAN NOTE
Abdominal pain associated with diarrhea -concerning for IBD or possible IBS  Rule out celiac disease or food intolerance    - schedule for upper endoscopy    - continue Carafate and put him on Protonix regularly    - Patient was explained about the lifestyle and dietary modifications  Advised to avoid fatty foods, chocolates, caffeine, alcohol and any other triggering foods  Avoid eating for at least 3 hours before going to bed

## 2021-07-19 LAB
A-LACTALB IGE QN: <0.1 KAU/I
ALLERGEN COMMENT: ABNORMAL
ALMOND IGE QN: <0.1 KUA/I
B-LACTOGLOB IGE QN: <0.1 KAU/I
CASEIN IGE QN: <0.1 KAU/I
CASHEW NUT IGE QN: <0.1 KUA/I
CODFISH IGE QN: <0.1 KUA/I
EGG WHITE IGE QN: <0.1 KUA/I
ENDOMYSIUM IGA SER QL: NEGATIVE
GLIADIN PEPTIDE IGA SER-ACNC: 4 UNITS (ref 0–19)
GLIADIN PEPTIDE IGG SER-ACNC: 2 UNITS (ref 0–19)
GLUTEN IGE QN: <0.1 KUA/I
HAZELNUT IGE QN: <0.1 KUA/L
IGA SERPL-MCNC: 307 MG/DL (ref 90–386)
MILK IGE QN: 0.25 KUA/I
PEANUT IGE QN: <0.1 KUA/I
SALMON IGE QN: <0.1 KUA/I
SCALLOP IGE QN: 0.14 KUA/L
SESAME SEED IGE QN: <0.1 KUA/I
SHRIMP IGE QN: 3.85 KUA/L
SOYBEAN IGE QN: <0.1 KUA/I
TOTAL IGE SMQN RAST: 395 KU/L (ref 0–113)
TTG IGA SER-ACNC: <2 U/ML (ref 0–3)
TTG IGG SER-ACNC: <2 U/ML (ref 0–5)
TUNA IGE QN: <0.1 KUA/I
WALNUT IGE QN: <0.1 KUA/I
WHEAT IGE QN: <0.1 KUA/I

## 2021-08-06 ENCOUNTER — APPOINTMENT (OUTPATIENT)
Dept: LAB | Facility: MEDICAL CENTER | Age: 21
End: 2021-08-06
Payer: COMMERCIAL

## 2021-08-06 DIAGNOSIS — R19.7 DIARRHEA, UNSPECIFIED TYPE: ICD-10-CM

## 2021-08-06 PROCEDURE — 83993 ASSAY FOR CALPROTECTIN FECAL: CPT

## 2021-08-06 PROCEDURE — 89055 LEUKOCYTE ASSESSMENT FECAL: CPT

## 2021-08-09 LAB — WBC SPEC QL GRAM STN: NORMAL

## 2021-08-10 LAB — CALPROTECTIN STL-MCNT: <16 UG/G (ref 0–120)

## 2021-08-11 ENCOUNTER — TELEPHONE (OUTPATIENT)
Dept: GASTROENTEROLOGY | Facility: AMBULARY SURGERY CENTER | Age: 21
End: 2021-08-11

## 2021-08-12 ENCOUNTER — ANESTHESIA EVENT (OUTPATIENT)
Dept: GASTROENTEROLOGY | Facility: AMBULARY SURGERY CENTER | Age: 21
End: 2021-08-12

## 2021-08-12 ENCOUNTER — HOSPITAL ENCOUNTER (OUTPATIENT)
Dept: GASTROENTEROLOGY | Facility: AMBULARY SURGERY CENTER | Age: 21
Setting detail: OUTPATIENT SURGERY
Discharge: HOME | End: 2021-08-12
Attending: INTERNAL MEDICINE | Admitting: INTERNAL MEDICINE
Payer: COMMERCIAL

## 2021-08-12 ENCOUNTER — ANESTHESIA (OUTPATIENT)
Dept: GASTROENTEROLOGY | Facility: AMBULARY SURGERY CENTER | Age: 21
End: 2021-08-12

## 2021-08-12 VITALS
RESPIRATION RATE: 18 BRPM | WEIGHT: 250 LBS | BODY MASS INDEX: 31.08 KG/M2 | SYSTOLIC BLOOD PRESSURE: 131 MMHG | TEMPERATURE: 97 F | HEART RATE: 80 BPM | HEIGHT: 75 IN | DIASTOLIC BLOOD PRESSURE: 73 MMHG | OXYGEN SATURATION: 96 %

## 2021-08-12 DIAGNOSIS — R10.13 EPIGASTRIC PAIN: ICD-10-CM

## 2021-08-12 DIAGNOSIS — R19.7 DIARRHEA, UNSPECIFIED TYPE: ICD-10-CM

## 2021-08-12 PROCEDURE — 45378 DIAGNOSTIC COLONOSCOPY: CPT | Performed by: INTERNAL MEDICINE

## 2021-08-12 PROCEDURE — 88305 TISSUE EXAM BY PATHOLOGIST: CPT | Performed by: PATHOLOGY

## 2021-08-12 PROCEDURE — 43239 EGD BIOPSY SINGLE/MULTIPLE: CPT | Performed by: INTERNAL MEDICINE

## 2021-08-12 RX ORDER — PROPOFOL 10 MG/ML
INJECTION, EMULSION INTRAVENOUS CONTINUOUS PRN
Status: DISCONTINUED | OUTPATIENT
Start: 2021-08-12 | End: 2021-08-12

## 2021-08-12 RX ORDER — PROPOFOL 10 MG/ML
INJECTION, EMULSION INTRAVENOUS AS NEEDED
Status: DISCONTINUED | OUTPATIENT
Start: 2021-08-12 | End: 2021-08-12

## 2021-08-12 RX ORDER — SODIUM CHLORIDE, SODIUM LACTATE, POTASSIUM CHLORIDE, CALCIUM CHLORIDE 600; 310; 30; 20 MG/100ML; MG/100ML; MG/100ML; MG/100ML
125 INJECTION, SOLUTION INTRAVENOUS CONTINUOUS
Status: DISCONTINUED | OUTPATIENT
Start: 2021-08-12 | End: 2021-08-16 | Stop reason: HOSPADM

## 2021-08-12 RX ORDER — LIDOCAINE HYDROCHLORIDE 20 MG/ML
INJECTION, SOLUTION EPIDURAL; INFILTRATION; INTRACAUDAL; PERINEURAL AS NEEDED
Status: DISCONTINUED | OUTPATIENT
Start: 2021-08-12 | End: 2021-08-12

## 2021-08-12 RX ORDER — LIDOCAINE HYDROCHLORIDE 10 MG/ML
0.5 INJECTION, SOLUTION EPIDURAL; INFILTRATION; INTRACAUDAL; PERINEURAL ONCE AS NEEDED
Status: DISCONTINUED | OUTPATIENT
Start: 2021-08-12 | End: 2021-08-16 | Stop reason: HOSPADM

## 2021-08-12 RX ADMIN — PROPOFOL 150 MCG/KG/MIN: 10 INJECTION, EMULSION INTRAVENOUS at 11:43

## 2021-08-12 RX ADMIN — SODIUM CHLORIDE, SODIUM LACTATE, POTASSIUM CHLORIDE, AND CALCIUM CHLORIDE: .6; .31; .03; .02 INJECTION, SOLUTION INTRAVENOUS at 11:28

## 2021-08-12 RX ADMIN — PROPOFOL 50 MG: 10 INJECTION, EMULSION INTRAVENOUS at 11:38

## 2021-08-12 RX ADMIN — PROPOFOL 50 MG: 10 INJECTION, EMULSION INTRAVENOUS at 11:35

## 2021-08-12 RX ADMIN — PROPOFOL 50 MG: 10 INJECTION, EMULSION INTRAVENOUS at 11:41

## 2021-08-12 RX ADMIN — PROPOFOL 100 MG: 10 INJECTION, EMULSION INTRAVENOUS at 11:31

## 2021-08-12 RX ADMIN — PROPOFOL 50 MG: 10 INJECTION, EMULSION INTRAVENOUS at 11:33

## 2021-08-12 RX ADMIN — LIDOCAINE HYDROCHLORIDE 100 MG: 20 INJECTION, SOLUTION EPIDURAL; INFILTRATION; INTRACAUDAL at 11:31

## 2021-08-12 RX ADMIN — PROPOFOL 100 MG: 10 INJECTION, EMULSION INTRAVENOUS at 11:32

## 2021-08-12 NOTE — ANESTHESIA PREPROCEDURE EVALUATION
Procedure:  COLONOSCOPY  EGD    Relevant Problems   ANESTHESIA (within normal limits)   (-) History of anesthesia complications      CARDIO   (-) Chest pain   (-) VERDUGO (dyspnea on exertion)      NEURO/PSYCH (within normal limits)      PULMONARY   (-) Shortness of breath   (-) URI (upper respiratory infection)        Physical Exam    Airway    Mallampati score: I  TM Distance: >3 FB  Neck ROM: full     Dental   No notable dental hx     Cardiovascular      Pulmonary      Other Findings        Anesthesia Plan  ASA Score- 1     Anesthesia Type- IV sedation with anesthesia with ASA Monitors  Additional Monitors:   Airway Plan:           Plan Factors-Exercise tolerance (METS): >4 METS  Chart reviewed  Induction- intravenous  Postoperative Plan-     Informed Consent- Anesthetic plan and risks discussed with patient  I personally reviewed this patient with the CRNA  Discussed and agreed on the Anesthesia Plan with the CRNA  Johnny [place]

## 2021-08-12 NOTE — INTERVAL H&P NOTE
H&P reviewed  After examining the patient I find no changes in the patients condition since the H&P had been written      Vitals:    08/12/21 1104   BP: 146/88   Pulse: 93   Resp: 20   Temp: 97 8 °F (36 6 °C)   SpO2: 98%

## 2021-08-12 NOTE — DISCHARGE INSTRUCTIONS
Hiatal Hernia   WHAT YOU NEED TO KNOW:   What is a hiatal hernia? A hiatal hernia is a condition that causes part of your stomach to bulge through the hiatus (small opening) in your diaphragm  The part of the stomach may move up and down, or it may get trapped above the diaphragm  What increases my risk for a hiatal hernia? The exact cause of a hiatal hernia is not known  You may have been born with a large hiatus  The following may increase your risk of a hiatal hernia:  · Obesity    · Older age    · Medical conditions such as diverticulosis or esophagitis    · Previous surgery of the esophagus or stomach or trauma such as from a motor vehicle accident    What are the types of hiatal hernia? · Type I (sliding hiatal hernia): A portion of the stomach slides in and out of the hiatus  This type is the most common and usually causes gastroesophageal reflux disease (GERD)  GERD occurs when the esophageal sphincter does not close properly and causes acid reflux  The esophageal sphincter is the lower muscle of the esophagus  · Type II (paraesophageal hiatal hernia):  Type II hiatal hernia forms when a part of the stomach squeezes through the hiatus and lies next to the esophagus  · Type III (combined):  Type III hiatal hernia is a combination of a sliding and a paraesophageal hiatal hernia  · Type IV (complex paraesophageal hiatal hernia): The whole stomach, the small and large bowels, spleen, pancreas, or liver is pushed up into the chest     What are the signs and symptoms of a hiatal hernia? The most common symptom is heartburn  This usually occurs after meals and spreads to your neck, jaw, or shoulder   You may have no signs or symptoms, or you may have any of the following:  · Abdominal pain, especially in the area just above your navel    · Bitter or acid taste in your mouth    · Trouble swallowing    · Coughing or hoarseness    · Chest pain or shortness of breath that occurs after eating    · Frequent burping or hiccups    · Uncomfortable feeling of fullness after eating    How is a hiatal hernia diagnosed? · An upper GI series test  includes x-rays of your esophagus, stomach, and your small intestines  It is also called a barium swallow test  You will be given barium (a chalky liquid) to drink before the pictures are taken  This liquid helps your stomach and intestines show up better on the x-rays  An upper GI series can show if you have an ulcer, a blocked intestine, or other problems  · An endoscopy  uses a scope to see the inside of your digestive tract  A scope is a long, bendable tube with a light on the end of it  A camera may be hooked to the scope to take pictures  How is a hiatal hernia treated? Treatment depends on the type of hiatal hernia you have and on your symptoms  You may not need any treatment  You may need any of the following:  · Medicines  may be given to relieve heartburn symptoms  These medicines help to decrease or block stomach acid  You may also be given medicines that help to tighten the esophageal sphincter  · Surgery  may be done when medicines cannot control your symptoms, or other problems are present  Your healthcare provider may also suggest surgery depending on the type of hernia you have  Your healthcare provider can put your stomach back into its normal location  He may make the hiatus (hole) smaller and anchor your stomach in your abdomen  Fundoplication is a surgery that wraps the upper part of the stomach around the esophageal sphincter to strengthen it  How can I manage symptoms? The following nutrition and lifestyle changes may be recommended to relieve symptoms of heartburn  · Avoid foods that make your symptoms worse  These may include spicy foods, fruit juices, alcohol, caffeine, chocolate, and mint  · Eat several small meals during the day    Small meals give your stomach less food to digest     · Avoid lying down and bending forward after you eat  Do not eat meals 2 to 3 hours before bedtime  This decreases your risk for reflux  · Maintain a healthy weight  If you are overweight, weight loss may help relieve your symptoms  · Sleep with your head elevated  at least 6 inches  · Do not smoke  Smoking can increase your symptoms of heartburn  When should I seek immediate care? · You have severe abdominal pain  · You try to vomit but nothing comes out (retching)  · You have severe chest pain and sudden trouble breathing  · Your bowel movements are black or bloody  · Your vomit looks like coffee grounds or has blood in it  When should I contact my healthcare provider? · Your symptoms are getting worse  · You have nausea, and you are vomiting  · You are losing weight without trying  · You have questions or concerns about your condition or care  CARE AGREEMENT:   You have the right to help plan your care  Learn about your health condition and how it may be treated  Discuss treatment options with your healthcare providers to decide what care you want to receive  You always have the right to refuse treatment  The above information is an  only  It is not intended as medical advice for individual conditions or treatments  Talk to your doctor, nurse or pharmacist before following any medical regimen to see if it is safe and effective for you  © Copyright [employer] 2021 Information is for End User's use only and may not be sold, redistributed or otherwise used for commercial purposes   All illustrations and images included in CareNotes® are the copyrighted property of [employer] , [employer]  or [address] [place], [place] [address]

## 2024-08-28 ENCOUNTER — OFFICE VISIT (OUTPATIENT)
Dept: FAMILY MEDICINE CLINIC | Facility: CLINIC | Age: 24
End: 2024-08-28

## 2024-08-28 VITALS
BODY MASS INDEX: 28.1 KG/M2 | RESPIRATION RATE: 18 BRPM | DIASTOLIC BLOOD PRESSURE: 84 MMHG | OXYGEN SATURATION: 98 % | WEIGHT: 226 LBS | HEART RATE: 72 BPM | SYSTOLIC BLOOD PRESSURE: 108 MMHG | TEMPERATURE: 97.5 F | HEIGHT: 75 IN

## 2024-08-28 DIAGNOSIS — R10.9 ABDOMINAL PAIN: ICD-10-CM

## 2024-08-28 DIAGNOSIS — R19.7 INTERMITTENT DIARRHEA: ICD-10-CM

## 2024-08-28 DIAGNOSIS — G89.29 CHRONIC UPPER ABDOMINAL PAIN: Primary | ICD-10-CM

## 2024-08-28 DIAGNOSIS — R10.13 EPIGASTRIC PAIN: ICD-10-CM

## 2024-08-28 DIAGNOSIS — R10.10 CHRONIC UPPER ABDOMINAL PAIN: Primary | ICD-10-CM

## 2024-08-28 DIAGNOSIS — K44.9 HIATAL HERNIA: ICD-10-CM

## 2024-08-28 PROCEDURE — 99203 OFFICE O/P NEW LOW 30 MIN: CPT | Performed by: FAMILY MEDICINE

## 2024-08-28 RX ORDER — SUCRALFATE 1 G/1
1 TABLET ORAL 4 TIMES DAILY
Qty: 60 TABLET | Refills: 0 | Status: SHIPPED | OUTPATIENT
Start: 2024-08-28

## 2024-08-28 RX ORDER — PANTOPRAZOLE SODIUM 40 MG/1
40 TABLET, DELAYED RELEASE ORAL DAILY
Qty: 30 TABLET | Refills: 2 | Status: SHIPPED | OUTPATIENT
Start: 2024-08-28

## 2024-08-28 NOTE — PROGRESS NOTES
Ambulatory Visit  Name: Haider Cruz      : 2000      MRN: [id number]  Encounter Provider: Luz Bolanos MD  Encounter Date: 2024   Encounter department: [facility]    Assessment & Plan   1. Chronic upper abdominal pain  Assessment & Plan:  H/o chronic abdominal pain  Possible due to gastritis vs IBS  Will restaurant the Pantoprazole and Carafate  Gerd diet  Refer to GI    Orders:  -     Ambulatory Referral to Gastroenterology; Future  2. Epigastric pain  -     pantoprazole (PROTONIX) 40 mg tablet; Take 1 tablet (40 mg total) by mouth daily  3. Abdominal pain  -     sucralfate (CARAFATE) 1 g tablet; Take 1 tablet (1 g total) by mouth 4 (four) times a day  4. Hiatal hernia  -     Ambulatory Referral to Gastroenterology; Future  5. Intermittent diarrhea  -     Ambulatory Referral to Gastroenterology; Future       History of Present Illness       Haider Cruz is a 24 y.o. male  who presented to the office today for a Same Day Visit.  Patient has a h/o of chronic epigastric pain for the past 5 years.    Most recently about 10 days ago he started to have increased pain and burning in his epigastric region.    H/o of GI consultation . S/EGD and colonoscopyp which showed a 1 cm hiatal hernia.  Patient is allergic to milk, shrimp, scallops which increased abdominal pain and dyspepsia.    He has been taking over-the-counter omeprazole intermittently with some relief of his pain.  The abdominal pain is associated with episodes of loose stools, no blood in stools.    Abdominal Pain  Associated symptoms include diarrhea and nausea. Pertinent negatives include no fever, headaches or vomiting.     The following portions of the patient's history were reviewed and updated as appropriate: allergies, current medications, past family history, past medical history, past social history, past surgical history and problem list.    Review of Systems   Constitutional:   "Negative for chills and fever.   HENT:  Negative for congestion, rhinorrhea and sore throat.    Respiratory:  Negative for cough and shortness of breath.    Cardiovascular:  Negative for chest pain.   Gastrointestinal:  Positive for abdominal pain, diarrhea and nausea. Negative for blood in stool and vomiting.   Skin:  Negative for rash.   Neurological:  Negative for dizziness and headaches.       Objective     /84 (BP Location: Left arm, Patient Position: Sitting, Cuff Size: Standard)   Pulse 72   Temp 97.5 °F (36.4 °C) (Temporal)   Resp 18   Ht 6' 3\" (1.905 m)   Wt 103 kg (226 lb)   SpO2 98%   BMI 28.25 kg/m²     Physical Exam  Vitals and nursing note reviewed.   Constitutional:       General: He is not in acute distress.     Appearance: He is well-developed.   HENT:      Head: Normocephalic and atraumatic.   Eyes:      Conjunctiva/sclera: Conjunctivae normal.   Cardiovascular:      Rate and Rhythm: Normal rate.      Heart sounds: No murmur heard.  Pulmonary:      Effort: Pulmonary effort is normal. No respiratory distress.   Abdominal:      Palpations: Abdomen is soft.      Tenderness: There is abdominal tenderness in the right lower quadrant and epigastric area.   Musculoskeletal:         General: No swelling.      Cervical back: Neck supple.   Skin:     General: Skin is warm and dry.      Capillary Refill: Capillary refill takes less than 2 seconds.   Neurological:      Mental Status: He is alert.   Psychiatric:         Mood and Affect: Mood normal.       Administrative Statements     "

## 2024-08-28 NOTE — ASSESSMENT & PLAN NOTE
H/o chronic abdominal pain  Possible due to gastritis vs IBS  Will restaurant the Pantoprazole and Carafate  Gerd diet  Refer to GI

## 2024-08-28 NOTE — PATIENT INSTRUCTIONS
"Patient Education     Irritable bowel syndrome   The Basics   Written by the doctors and editors at [facility]   What is irritable bowel syndrome? -- Irritable bowel syndrome, or \"IBS,\" is a chronic condition that causes belly pain and problems with bowel movements. \"Chronic\" means that it is long term and needs to be managed throughout a person's life.  Some people with IBS have frequent, watery bowel movements (diarrhea). Others do not have enough bowel movements (constipation). Some people go back and forth between diarrhea and constipation.  What are the symptoms of IBS? -- Symptoms include:   Stomach pain and cramps, related to a bowel movement   Diarrhea, constipation, or both   Bloating   Gas   Mucus in bowel movements  Is there a test for IBS? -- No. But your doctor or nurse can figure out if you have IBS by asking you a few questions, and by running tests to make sure that you do not have something else instead.  Lots of medical conditions can cause the same symptoms as IBS. So it is important that your doctor or nurse checks the other possibilities.  What can I do to feel better? -- You can:   Keep track of what you ate each day, what you did, and how you felt. That way, you can figure out if anything you do or eat makes your symptoms better or worse.   Avoid foods that might make your IBS worse. Start by avoiding foods that give you gas. Then, you can try avoiding milk, ice cream, and other foods that have traces of milk for 2 weeks. Ask your doctor or nurse for advice on which foods can make IBS worse. They might suggest changing your diet. Some people need to:   Eat a \"low-FODMAP\" diet - FODMAP stands for \"fermentable oligosaccharides, disaccharides, monosaccharides, and polyols.\" These are types of carbohydrates that are harder for the body to digest than other types. A low-FODMAP diet means limiting foods that contain these carbohydrates.   Avoid lactose - This is the main form of sugar found in milk.   " "Eat more fiber, if you have constipation. You can do this by eating more fruits and vegetables. Or you can take fiber pills or powders. (If eating more fiber makes your symptoms worse, cut back on the fiber.)   Get regular physical activity. Try to do something active for 20 to 60 minutes, at least 3 to 5 days a week. Studies show this helps improve IBS symptoms. Even gentle forms of movement, like walking, can help.  How is IBS treated? -- IBS cannot be cured. But there are medicines that can help relieve symptoms, including:   Medicines to ease diarrhea   Medicines to ease constipation   Antidepressants - These work by blocking pain. When used to treat IBS, they are given at a much lower dose than would normally be given to treat depression.   \"Antispasmodics\" - These might help with cramping.   Antibiotics - These sometimes help with bloating and diarrhea.  Stress can make IBS symptoms worse. It might help to find ways to relax and manage stress. You can talk with a counselor, or talk with others going through similar things. Support groups can happen in person or online.  What will my life be like? -- Most people with IBS have the condition for the rest of their life. Even so, most people find ways to improve their symptoms. Keep working with your doctor or nurse until you find an approach that works.  When should I call the doctor? -- Call for advice if you have:   Stool with blood in it or stool that looks black   Diarrhea that wakes you up from sleep   Changes in your bowel movements that do not go away - This can include a change in the color, amount, and frequency of bowel movements.   Belly pain that is severe or gets worse   Lost weight without trying   Signs of infection - These include a fever of 100.4°F (38°C) or higher, or chills.  All topics are updated as new evidence becomes available and our peer review process is complete.  This topic retrieved from "[employer] on: May 12, 2024.  Topic 46392 Version " 10.0  Release: 32.4.3 - C32.131  © 2024 [employer] and/or its affiliates. All rights reserved.  Consumer Information Use and Disclaimer   Disclaimer: This generalized information is a limited summary of diagnosis, treatment, and/or medication information. It is not meant to be comprehensive and should be used as a tool to help the user understand and/or assess potential diagnostic and treatment options. It does NOT include all information about conditions, treatments, medications, side effects, or risks that may apply to a specific patient. It is not intended to be medical advice or a substitute for the medical advice, diagnosis, or treatment of a health care provider based on the health care provider's examination and assessment of a patient's specific and unique circumstances. Patients must speak with a health care provider for complete information about their health, medical questions, and treatment options, including any risks or benefits regarding use of medications. This information does not endorse any treatments or medications as safe, effective, or approved for treating a specific patient. Up[employer], Inc[employer] and its affiliates disclaim any warranty or liability relating to this information or the use thereof.The use of this information is governed by the Terms of Use, available at https://www.woltersRossoliniuwer.com/en/know/clinical-effectiveness-terms. 2024© Up[employer]Date, Inc. and its affiliates and/or licensors. All rights reserved.  Copyright   © 2024 [employer] and/or its affiliates. All rights reserved.

## 2024-09-06 ENCOUNTER — TELEPHONE (OUTPATIENT)
Dept: FAMILY MEDICINE CLINIC | Facility: CLINIC | Age: 24
End: 2024-09-06

## 2024-09-06 DIAGNOSIS — R10.9 ABDOMINAL PAIN: ICD-10-CM

## 2024-09-06 DIAGNOSIS — R10.13 EPIGASTRIC PAIN: ICD-10-CM

## 2024-09-06 NOTE — TELEPHONE ENCOUNTER
Chito call patient for the GI referral but during call patient states he did not receive the medication that was sent to pharmacy .       sucralfate (CARAFATE) 1 g tablet [[id number]]    Order Details  Dose: 1 g Route: Oral   pantoprazole (PROTONIX) 40 mg tablet [[id number]]    Order Details  Dose: 40 mg Route: Oral       Please advice regarding it . Thank you!   Per patient wants to try first the medication and will give us a call back to schedule appt. He currently does not have it

## 2024-09-10 RX ORDER — SUCRALFATE 1 G/1
1 TABLET ORAL 4 TIMES DAILY
Qty: 60 TABLET | Refills: 0 | Status: SHIPPED | OUTPATIENT
Start: 2024-09-10

## 2024-09-10 RX ORDER — PANTOPRAZOLE SODIUM 40 MG/1
40 TABLET, DELAYED RELEASE ORAL DAILY
Qty: 30 TABLET | Refills: 2 | Status: SHIPPED | OUTPATIENT
Start: 2024-09-10

## 2025-02-19 ENCOUNTER — APPOINTMENT (OUTPATIENT)
Dept: URGENT CARE | Facility: MEDICAL CENTER | Age: 25
End: 2025-02-19